# Patient Record
Sex: FEMALE | NOT HISPANIC OR LATINO | Employment: UNEMPLOYED | URBAN - METROPOLITAN AREA
[De-identification: names, ages, dates, MRNs, and addresses within clinical notes are randomized per-mention and may not be internally consistent; named-entity substitution may affect disease eponyms.]

---

## 2024-05-21 ENCOUNTER — HOSPITAL ENCOUNTER (EMERGENCY)
Facility: HOSPITAL | Age: 29
Discharge: HOME | End: 2024-05-21
Attending: STUDENT IN AN ORGANIZED HEALTH CARE EDUCATION/TRAINING PROGRAM | Admitting: STUDENT IN AN ORGANIZED HEALTH CARE EDUCATION/TRAINING PROGRAM
Payer: COMMERCIAL

## 2024-05-21 VITALS
HEIGHT: 64 IN | BODY MASS INDEX: 22.02 KG/M2 | SYSTOLIC BLOOD PRESSURE: 143 MMHG | WEIGHT: 129 LBS | TEMPERATURE: 99.1 F | RESPIRATION RATE: 17 BRPM | HEART RATE: 133 BPM | OXYGEN SATURATION: 99 % | DIASTOLIC BLOOD PRESSURE: 89 MMHG

## 2024-05-21 DIAGNOSIS — U07.1 COVID-19: Primary | ICD-10-CM

## 2024-05-21 LAB
FLUAV SUBTYP SPEC NAA+PROBE: NOT DETECTED
FLUBV RNA ISLT QL NAA+PROBE: NOT DETECTED
SARS-COV-2 RNA RESP QL NAA+PROBE: DETECTED
STREP A PCR: NOT DETECTED

## 2024-05-21 PROCEDURE — 63710000001 PREDNISONE PER 1 MG: Performed by: STUDENT IN AN ORGANIZED HEALTH CARE EDUCATION/TRAINING PROGRAM

## 2024-05-21 PROCEDURE — 87651 STREP A DNA AMP PROBE: CPT | Performed by: STUDENT IN AN ORGANIZED HEALTH CARE EDUCATION/TRAINING PROGRAM

## 2024-05-21 PROCEDURE — 87636 SARSCOV2 & INF A&B AMP PRB: CPT | Performed by: STUDENT IN AN ORGANIZED HEALTH CARE EDUCATION/TRAINING PROGRAM

## 2024-05-21 PROCEDURE — 99283 EMERGENCY DEPT VISIT LOW MDM: CPT

## 2024-05-21 PROCEDURE — 99283 EMERGENCY DEPT VISIT LOW MDM: CPT | Performed by: STUDENT IN AN ORGANIZED HEALTH CARE EDUCATION/TRAINING PROGRAM

## 2024-05-21 RX ORDER — PREDNISONE 20 MG/1
20 TABLET ORAL ONCE
Status: COMPLETED | OUTPATIENT
Start: 2024-05-21 | End: 2024-05-21

## 2024-05-21 RX ORDER — PREDNISONE 20 MG/1
20 TABLET ORAL 2 TIMES DAILY
Qty: 10 TABLET | Refills: 0 | Status: SHIPPED | OUTPATIENT
Start: 2024-05-21 | End: 2024-05-26

## 2024-05-21 RX ADMIN — PREDNISONE 20 MG: 20 TABLET ORAL at 23:04

## 2024-05-22 NOTE — FSED PROVIDER NOTE
Subjective   History of Present Illness  Pt is a 29 y.o. female with PMH as listed who presents for   Chief Complaint   Patient presents with    Generalized Body Aches     PT C/O BODY ACHES, HEADACHE AND FEVER X 1 DAY        Patient is a 29-year-old female presents for body aches, headache, subjective fever and chills and sore throat.  Symptoms been going on for the past day except for sore throat that has been present for the past 2 days.  She has had multiple sick contacts with both strep and COVID at home.  No chest pain, shortness of breath, nausea or vomiting.  No other new complaints    Review of Systems    History reviewed. No pertinent past medical history.    No Known Allergies    History reviewed. No pertinent surgical history.    History reviewed. No pertinent family history.    Social History     Socioeconomic History    Marital status: Single           Objective   Physical Exam  Constitutional:       Appearance: Normal appearance.   HENT:      Head: Normocephalic and atraumatic.      Left Ear: Tympanic membrane normal.      Ears:      Comments: Right TM slightly erythematous     Mouth/Throat:      Mouth: Mucous membranes are moist.      Pharynx: Oropharynx is clear. Posterior oropharyngeal erythema present. No oropharyngeal exudate.   Eyes:      Conjunctiva/sclera: Conjunctivae normal.   Cardiovascular:      Rate and Rhythm: Regular rhythm. Tachycardia present.   Pulmonary:      Effort: Pulmonary effort is normal.      Breath sounds: Normal breath sounds.   Abdominal:      General: Abdomen is flat.   Musculoskeletal:      Cervical back: Neck supple.   Skin:     General: Skin is warm and dry.   Neurological:      Mental Status: She is alert.   Psychiatric:         Mood and Affect: Mood normal.         Procedures           ED Course  ED Course as of 05/21/24 2239   Tue May 21, 2024   2226 Patient is a 29-year-old female presents for sore throat, body aches, headache.  Exam significant for erythematous  oropharynx and slightly erythematous right TM.  Will obtain COVID and flu swab and strep screen due to exposure to both of these. [JF]   2258 COVID-19 positive, strep negative.  Discussed patient plan for discharge with prednisone prescription and follow-up with her PCP.  Patient stands and agrees with plan of care.  All questions answered. [JF]      ED Course User Index  [JF] Flo Smith MD                                           Medical Decision Making  My differential diagnosis for sore throat includes but is not limited to viral pharyngitis, strep pharyngitis, mononucleosis, epiglottitis, esophageal abrasion, peritonsillar abscess, retropharyngeal abscess, tonsillitis, scarlet fever, viral syndrome, COVID-19 and herpetic gingival stomatitis        Problems Addressed:  COVID-19: complicated acute illness or injury    Amount and/or Complexity of Data Reviewed  Labs: ordered.     Details: COVID-19 positive    Risk  Prescription drug management.        Final diagnoses:   COVID-19       ED Disposition  ED Disposition       ED Disposition   Discharge    Condition   Stable    Comment   --               [facility] - [place] [address]  [phone]  Call in 2 days  For re-evaluation, to establish care         Medication List        New Prescriptions      predniSONE 20 MG tablet  Commonly known as: DELTASONE  Take 1 tablet by mouth 2 (Two) Times a Day for 5 days.               Where to Get Your Medications        These medications were sent to [facility] [id number] - [place], [place] - [address] AT [facility] - [phone]  - [phone] fx [address], [facility] [address]      Phone: [phone]   predniSONE 20 MG tablet

## 2025-07-07 ENCOUNTER — OFFICE VISIT (OUTPATIENT)
Dept: FAMILY MEDICINE CLINIC | Facility: CLINIC | Age: 30
End: 2025-07-07
Payer: COMMERCIAL

## 2025-07-07 VITALS
TEMPERATURE: 97.5 F | WEIGHT: 164 LBS | HEIGHT: 64 IN | BODY MASS INDEX: 28 KG/M2 | HEART RATE: 78 BPM | OXYGEN SATURATION: 98 %

## 2025-07-07 DIAGNOSIS — E55.9 VITAMIN D DEFICIENCY: Primary | ICD-10-CM

## 2025-07-07 DIAGNOSIS — Z00.00 ENCOUNTER FOR MEDICAL EXAMINATION TO ESTABLISH CARE: ICD-10-CM

## 2025-07-07 PROCEDURE — 1159F MED LIST DOCD IN RCRD: CPT | Performed by: STUDENT IN AN ORGANIZED HEALTH CARE EDUCATION/TRAINING PROGRAM

## 2025-07-07 PROCEDURE — 1160F RVW MEDS BY RX/DR IN RCRD: CPT | Performed by: STUDENT IN AN ORGANIZED HEALTH CARE EDUCATION/TRAINING PROGRAM

## 2025-07-07 PROCEDURE — 99214 OFFICE O/P EST MOD 30 MIN: CPT | Performed by: STUDENT IN AN ORGANIZED HEALTH CARE EDUCATION/TRAINING PROGRAM

## 2025-07-07 RX ORDER — ERGOCALCIFEROL 1.25 MG/1
1 CAPSULE, LIQUID FILLED ORAL WEEKLY
COMMUNITY
Start: 2025-05-15

## 2025-07-07 NOTE — PROGRESS NOTES
"Chief Complaint  Establish Care and Weight Loss    Subjective          Trisha Salvador presents to [facility]  History of Present Illness      History of Present Illness  The patient presents for weight loss and vitamin D deficiency.    She has been under the care of a general physician, who prescribed her phentermine 37.5 mg in 03/2025. She completed the course of this medication over a month ago and is not currently taking it. She reports no side effects from the phentermine. She has previously tried Wegovy for a week but discontinued it due to constipation, a symptom she also experiences with her irritable bowel syndrome (IBS).    Her last lab work was conducted in 03/2025 at [facility], which revealed a vitamin D level of 1. She is on a weekly regimen of vitamin D supplements.      Current Outpatient Medications   Medication Instructions    vitamin D (ERGOCALCIFEROL) 1.25 MG (69381 UT) capsule capsule 1 capsule, Weekly       The following portions of the patient's history were reviewed and updated as appropriate: allergies, current medications, past family history, past medical history, past social history, past surgical history, and problem list.    Objective   Vital Signs:   Pulse 78   Temp 97.5 °F (36.4 °C)   Ht 162.6 cm (64\")   Wt 74.4 kg (164 lb)   SpO2 98%   BMI 28.15 kg/m²     BP Readings from Last 3 Encounters:   05/21/24 143/89     Wt Readings from Last 3 Encounters:   07/07/25 74.4 kg (164 lb)   05/21/24 58.5 kg (129 lb)     BMI is >= 25 and <30. (Overweight) The following options were offered after discussion;: weight loss educational material (shared in after visit summary), exercise counseling/recommendations, nutrition counseling/recommendations, pharmacological intervention options, and referral to a nutritionist     Physical Exam  Constitutional:       Appearance: Normal appearance.   HENT:      Head: Normocephalic.      Nose: Nose normal.      " Mouth/Throat:      Mouth: Mucous membranes are moist.   Eyes:      Pupils: Pupils are equal, round, and reactive to light.   Cardiovascular:      Rate and Rhythm: Normal rate and regular rhythm.   Pulmonary:      Effort: Pulmonary effort is normal.   Abdominal:      General: Abdomen is flat.   Musculoskeletal:         General: Normal range of motion.      Cervical back: Normal range of motion.   Skin:     General: Skin is warm and dry.   Neurological:      General: No focal deficit present.      Mental Status: She is alert.   Psychiatric:         Mood and Affect: Mood normal.         Thought Content: Thought content normal.            Result Review :   The following data was reviewed by: Jackson Parkinson MD on 07/07/2025:           Lab Results   Component Value Date    COVID19 Detected (C) 05/21/2024       Procedures        Assessment and Plan    There are no diagnoses linked to this encounter.      Assessment & Plan  1. Weight loss.  - BMI is currently 28, indicating a need for weight reduction.  - Previously taken phentermine 37.5 mg but is not currently on it.  - Discussed potential use of Wegovy (semaglutide) injections for weight loss, including its mechanism of action, side effects, and dosage adjustments. Provided a sample of Wegovy for trial.  - Advised to maintain a calorie-restricted diet to aid in weight loss. If effective, patient will inform the office by the second week to initiate the prescription process.    2. Vitamin D deficiency.  - Significant vitamin D deficiency, with levels previously reported as extremely low (1 or 0).  - This deficiency can contribute to fatigue and increased hunger.  - Currently taking vitamin D supplements once a week.  - Advised to continue this regimen to address the deficiency.    Follow-up  - Patient will follow up in 3 months.      There are no discontinued medications.       Follow Up   No follow-ups on file.  Patient was given instructions and counseling regarding her  condition or for health maintenance advice. Please see specific information pulled into the AVS if appropriate.       Jackson Parkinson MD  07/07/25  10:42 EDT      Patient or patient representative verbalized consent for the use of Ambient Listening during the visit with  Jackson Parkinson MD for chart documentation. 7/7/2025  10:42 EDT